# Patient Record
Sex: MALE | Race: WHITE | NOT HISPANIC OR LATINO | Employment: FULL TIME | URBAN - METROPOLITAN AREA
[De-identification: names, ages, dates, MRNs, and addresses within clinical notes are randomized per-mention and may not be internally consistent; named-entity substitution may affect disease eponyms.]

---

## 2017-03-29 ENCOUNTER — OFFICE VISIT (OUTPATIENT)
Dept: FAMILY MEDICINE | Facility: OTHER | Age: 27
End: 2017-03-29
Payer: COMMERCIAL

## 2017-03-29 VITALS
HEIGHT: 67 IN | RESPIRATION RATE: 18 BRPM | HEART RATE: 84 BPM | SYSTOLIC BLOOD PRESSURE: 136 MMHG | BODY MASS INDEX: 36.1 KG/M2 | WEIGHT: 230 LBS | DIASTOLIC BLOOD PRESSURE: 84 MMHG | TEMPERATURE: 98.3 F

## 2017-03-29 DIAGNOSIS — K04.7 DENTAL ABSCESS: Primary | ICD-10-CM

## 2017-03-29 PROCEDURE — 99213 OFFICE O/P EST LOW 20 MIN: CPT | Performed by: NURSE PRACTITIONER

## 2017-03-29 ASSESSMENT — PAIN SCALES - GENERAL: PAINLEVEL: EXTREME PAIN (9)

## 2017-03-29 NOTE — PROGRESS NOTES
"  SUBJECTIVE:                                                    Jarocho Chavez is a 27 year old male who presents to clinic today for the following health issues:    HPI    Concern - Left side of face swollen      Onset: 2 days    Description:   Left side of face swollen from tooth infection. Tooth pain started 2-3 days ago    Intensity: severe, 9/10    Progression of Symptoms:  worsening and constant    Accompanying Signs & Symptoms:  Swelling has about doubled in size since 4:30am       Previous history of similar problem:   Other teeth problems    Precipitating factors:   Worsened by: time    Alleviating factors:  Improved by: nothing       Therapies Tried and outcome: Heat, cold and ibuprofen - no relief. Patient has dental appointment at 1:00 today.  Has history of poor dental caries states has had problems ever since he had a root canal about 3 years ago.     Problem list and histories reviewed & adjusted, as indicated.  Additional history: as documented      ROS:  Constitutional, HEENT, cardiovascular, pulmonary, gi and gu systems are negative, except as otherwise noted.    OBJECTIVE:                                                    /84 (BP Location: Left arm, Patient Position: Chair, Cuff Size: Adult Regular)  Pulse 84  Temp 98.3  F (36.8  C) (Temporal)  Resp 18  Ht 5' 7\" (1.702 m)  Wt 230 lb (104.3 kg)  BMI 36.02 kg/m2  Body mass index is 36.02 kg/(m^2).  GENERAL: pale and fatigued  HENT: normal cephalic/atraumatic, ear canals and TM's normal, nose and mouth Periodontal Exam shows  gingivitis, inflamed, warmth and erythema    NECK: bilateral anterior cervical adenopathy, no asymmetry, masses, or scars and thyroid normal to palpation  RESP: lungs clear to auscultation - no rales, rhonchi or wheezes  CV: regular rate and rhythm, normal S1 S2, no S3 or S4, no murmur, click or rub, no peripheral edema and peripheral pulses strong  ABDOMEN: soft, nontender, no hepatosplenomegaly, no masses " and bowel sounds normal  MS: no gross musculoskeletal defects noted, no edema    Diagnostic Test Results:  none      ASSESSMENT/PLAN:                                                      1. Dental abscess  Will follow up with dentist today and have dentist refer oral surgeon.  - amoxicillin-clavulanate (AUGMENTIN) 875-125 MG per tablet; Take 1 tablet by mouth 2 times daily  Dispense: 20 tablet; Refill: 0    See Patient Instructions    [facility]

## 2017-03-29 NOTE — PATIENT INSTRUCTIONS
Please follow up with dentist today for referral to dental surgeon. Continue to rinse mouth frequently.     Return to clinic if symptoms worsen or do not improve with treatment      Thank you  Tenisha Mccord CNP

## 2017-03-29 NOTE — MR AVS SNAPSHOT
"              After Visit Summary   3/29/2017    Jarocho Chavez    MRN: [id number]           Patient Information     Date Of Birth          1990        Visit Information        Provider Department      3/29/2017 8:40 AM Tenisha Mccord APRN CNP [facility]        Today's Diagnoses     Dental abscess    -  1      Care Instructions    Please follow up with dentist today for referral to dental surgeon. Continue to rinse mouth frequently.     Return to clinic if symptoms worsen or do not improve with treatment      Thank you  Tenisha Mccord CNP          Follow-ups after your visit        Who to contact     If you have questions or need follow up information about today's clinic visit or your schedule please contact [facility] directly at [phone].  Normal or non-critical lab and imaging results will be communicated to you by MyChart, letter or phone within 4 business days after the clinic has received the results. If you do not hear from us within 7 days, please contact the clinic through MyChart or phone. If you have a critical or abnormal lab result, we will notify you by phone as soon as possible.  Submit refill requests through [employer] or call your pharmacy and they will forward the refill request to us. Please allow 3 business days for your refill to be completed.          Additional Information About Your Visit        [employer] Information     [employer] lets you send messages to your doctor, view your test results, renew your prescriptions, schedule appointments and more. To sign up, go to [email or web address] . Click on \"Log in\" on the left side of the screen, which will take you to the Welcome page. Then click on \"Sign up Now\" on the right side of the page.     You will be asked to enter the access code listed below, as well as some personal information. Please follow the directions to create your username and password.     Your access code is: " "[email or web address]  Expires: 2017  9:27 AM     Your access code will  in 90 days. If you need help or a new code, please call your [facility] clinic or [phone].        Care EveryWhere ID     This is your Care EveryWhere ID. This could be used by other organizations to access your [facility] medical records  [id number]        Your Vitals Were     Pulse Respirations Height BMI (Body Mass Index)          84 18 5' 7\" (1.702 m) 36.02 kg/m2         Blood Pressure from Last 3 Encounters:   17 136/84   11 126/66   02/12/10 136/73    Weight from Last 3 Encounters:   17 230 lb (104.3 kg)              Today, you had the following     No orders found for display         Today's Medication Changes          These changes are accurate as of: 3/29/17  9:27 AM.  If you have any questions, ask your nurse or doctor.               Start taking these medicines.        Dose/Directions    amoxicillin-clavulanate 875-125 MG per tablet   Commonly known as:  AUGMENTIN   Used for:  Dental abscess   Started by:  Tenisha Mccord APRN CNP        Dose:  1 tablet   Take 1 tablet by mouth 2 times daily   Quantity:  20 tablet   Refills:  0            Where to get your medicines      These medications were sent to [facility] [address] [place] [address] - [address]  [address] [facility] [address]     Phone:  [phone]     amoxicillin-clavulanate 875-125 MG per tablet                Primary Care Provider    None       No address on file        Thank you!     Thank you for choosing [facility]  for your care. Our goal is always to provide you with excellent care. Hearing back from our patients is one way we can continue to improve our services. Please take a few minutes to complete the written survey that you may receive in the mail after your visit with us. Thank you!             Your Updated Medication List - Protect others around you: Learn how to safely use, store and throw away your medicines at " www.disposemymeds.org.          This list is accurate as of: 3/29/17  9:27 AM.  Always use your most recent med list.                   Brand Name Dispense Instructions for use    ALBUTEROL IN      None Entered       amoxicillin-clavulanate 875-125 MG per tablet    AUGMENTIN    20 tablet    Take 1 tablet by mouth 2 times daily

## 2024-05-28 ENCOUNTER — HOSPITAL ENCOUNTER (EMERGENCY)
Facility: CLINIC | Age: 34
Discharge: HOME | End: 2024-05-28
Attending: STUDENT IN AN ORGANIZED HEALTH CARE EDUCATION/TRAINING PROGRAM | Admitting: STUDENT IN AN ORGANIZED HEALTH CARE EDUCATION/TRAINING PROGRAM

## 2024-05-28 VITALS
BODY MASS INDEX: 30.48 KG/M2 | DIASTOLIC BLOOD PRESSURE: 98 MMHG | OXYGEN SATURATION: 98 % | WEIGHT: 230 LBS | HEART RATE: 87 BPM | TEMPERATURE: 98.1 F | HEIGHT: 73 IN | SYSTOLIC BLOOD PRESSURE: 138 MMHG | RESPIRATION RATE: 18 BRPM

## 2024-05-28 DIAGNOSIS — M25.512 ACUTE PAIN OF LEFT SHOULDER: ICD-10-CM

## 2024-05-28 PROCEDURE — 99283 EMERGENCY DEPT VISIT LOW MDM: CPT | Performed by: STUDENT IN AN ORGANIZED HEALTH CARE EDUCATION/TRAINING PROGRAM

## 2024-05-28 PROCEDURE — 99284 EMERGENCY DEPT VISIT MOD MDM: CPT | Performed by: STUDENT IN AN ORGANIZED HEALTH CARE EDUCATION/TRAINING PROGRAM

## 2024-05-28 RX ORDER — METHYLPREDNISOLONE 4 MG
TABLET, DOSE PACK ORAL
Qty: 21 TABLET | Refills: 0 | Status: SHIPPED | OUTPATIENT
Start: 2024-05-28

## 2024-05-28 RX ORDER — METHOCARBAMOL 750 MG/1
750 TABLET, FILM COATED ORAL 4 TIMES DAILY PRN
Qty: 15 TABLET | Refills: 0 | Status: SHIPPED | OUTPATIENT
Start: 2024-05-28 | End: 2024-06-02

## 2024-05-28 ASSESSMENT — COLUMBIA-SUICIDE SEVERITY RATING SCALE - C-SSRS
6. HAVE YOU EVER DONE ANYTHING, STARTED TO DO ANYTHING, OR PREPARED TO DO ANYTHING TO END YOUR LIFE?: NO
2. HAVE YOU ACTUALLY HAD ANY THOUGHTS OF KILLING YOURSELF IN THE PAST MONTH?: NO
1. IN THE PAST MONTH, HAVE YOU WISHED YOU WERE DEAD OR WISHED YOU COULD GO TO SLEEP AND NOT WAKE UP?: NO

## 2024-05-28 ASSESSMENT — ACTIVITIES OF DAILY LIVING (ADL): ADLS_ACUITY_SCORE: 37

## 2024-05-28 NOTE — DISCHARGE INSTRUCTIONS
Your exam today is very reassuring.  I have very low suspicion for fracture or dislocation, since your range of motion is normal and you do not have any specific areas of tenderness to the bone.    I think the best thing for your shoulder would be to rest it for a few days.  Alternate between ice and heat.  You should also alternate between Tylenol and ibuprofen every few hours.      I think you will also benefit from a short course of steroids to help with inflammation.  Take this as instructed until it is gone.  Prescription for muscle relaxer was provided as well.  Use this only as needed.    Referral to orthopedics was provided today; make an appointment soon as possible.  You may benefit from additional workup/treatment from them.  Return to the emergency department sooner for any new or worsening symptoms.

## 2024-05-28 NOTE — ED PROVIDER NOTES
History     Chief Complaint   Patient presents with    Shoulder Pain     HPI  Jarocho Chavez is a 34 year old male with no relevant medical history who presents for evaluation of left shoulder pain.  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  He started noticing some pain in decreased range of motion to the left shoulder about 5 days ago.  At first he thought he just slept on it wrong, but the discomfort persisted much longer than usual.  He mostly notices some stiffness in the morning but is able to overcome this throughout the day.  If anything, the pain and stiffness is gradually improving, but he is hoping to return to work for the week and came in for evaluation.  Ibuprofen is only providing temporary relief.  He denies any known arthritis or previous surgeries to the shoulder.  Patient further denies any neck pain, focal tingling or weakness, redness or warmth, other complaints today.    Allergies:  Allergies   Allergen Reactions    Suprax [Cefixime]      Problem List:    There are no problems to display for this patient.     Past Medical History:    No past medical history on file.    Past Surgical History:    No past surgical history on file.    Family History:    No family history on file.    Social History:  Marital Status:  Single [1]  Social History     Tobacco Use    Smoking status: Every Day     Current packs/day: 1.00     Average packs/day: 1 pack/day for 5.0 years (5.0 ttl pk-yrs)     Types: Cigarettes   Substance Use Topics    Alcohol use: Yes     Comment: occ    Drug use: No        Medications:    methocarbamol (ROBAXIN) 750 MG tablet  methylPREDNISolone (MEDROL DOSEPAK) 4 MG tablet therapy pack  ALBUTEROL IN  amoxicillin-clavulanate (AUGMENTIN) 875-125 MG per tablet      Review of Systems   All other systems reviewed and are negative.  See HPI.    Physical Exam   BP: (!)  "138/98  Pulse: 87  Temp: 98.1  F (36.7  C)  Resp: 18  Height: 185.4 cm (6' 1\")  Weight: 104.3 kg (230 lb)  SpO2: 98 %    Physical Exam  Vitals and nursing note reviewed.   Constitutional:       Appearance: Normal appearance. He is not toxic-appearing.      Comments: Slightly anxious.  Otherwise no distress.   HENT:      Head: Normocephalic and atraumatic.   Eyes:      General: No scleral icterus.     Extraocular Movements: Extraocular movements intact.      Conjunctiva/sclera: Conjunctivae normal.      Pupils: Pupils are equal, round, and reactive to light.   Neck:      Comments: No midline tenderness.  No paraspinal muscular tenderness.  Range of motion is entirely normal.  Cardiovascular:      Rate and Rhythm: Normal rate and regular rhythm.      Pulses: Normal pulses.      Heart sounds: Normal heart sounds.   Pulmonary:      Effort: Pulmonary effort is normal. No respiratory distress.      Breath sounds: Normal breath sounds.   Abdominal:      Palpations: Abdomen is soft.      Tenderness: There is no abdominal tenderness.   Musculoskeletal:         General: No tenderness or deformity. Normal range of motion.      Cervical back: Neck supple. No rigidity or tenderness.      Comments: Patient describes having a stiffness sensation to the left shoulder, but has completely range of motion including full active abduction and flexion.  Patient has normal strength with internal/external rotation.   strength is normal.  He has no focal areas of tenderness to the clavicle, AC joint, or any other bony structures along the scapula or proximal humerus.   Skin:     General: Skin is warm.      Capillary Refill: Capillary refill takes less than 2 seconds.      Findings: No erythema or rash.   Neurological:      Mental Status: He is alert.         ED Course        Procedures            No results found for this or any previous visit (from the past 24 hour(s)).    Medications - No data to display    Assessments & Plan (with " Medical Decision Making)     I have reviewed the nursing notes.    I have reviewed the findings, diagnosis, plan and need for follow up with the patient.    Medical Decision Making  Jarocho Chavez is a 34 year old male with no relevant medical history who presents for evaluation of left shoulder pain.  Normal vitals on arrival.  Exam is very reassuring overall.  Patient states that his symptoms have been gradually improving over the past few days and he actually has normal active range of motion, including full abduction and flexion.  He has no reproducible areas of bony tenderness and I do not appreciate any clicking or clunking this during range of motion exercise to raise suspicion for dislocation.  I think his symptoms are probably due to a rotator cuff injury, arthritis, or adhesive capsulitis.  X-ray would probably show some degree of arthritis, but I doubt this would significantly  today.  Patient was in agreement with this plan.  I think he would most benefit from resting the shoulder for a few days and using anti-inflammatory therapies including Tylenol, ice.  Will also provide a Medrol dose pack to help with inflammation.  Referral to orthopedics provided for further evaluation and treatment.  Patient agrees to follow-up as instructed and will return sooner for any new or acutely worsening symptoms.    Discharge Medication List as of 5/28/2024  6:50 AM        START taking these medications    Details   methocarbamol (ROBAXIN) 750 MG tablet Take 1 tablet (750 mg) by mouth 4 times daily as needed for muscle spasms, Disp-15 tablet, R-0, E-Prescribe      methylPREDNISolone (MEDROL DOSEPAK) 4 MG tablet therapy pack Follow Package Directions, Disp-21 tablet, R-0, E-Prescribe           Final diagnoses:   Acute pain of left shoulder     5/28/2024   [facility] EMERGENCY DEPT       Hermilo Fraser MD  05/28/24 [id number]

## 2024-05-28 NOTE — Clinical Note
Jarocho Chavez was seen and treated in our emergency department on 5/28/2024.  He may return to work on 05/31/2024.       If you have any questions or concerns, please don't hesitate to call.      Hermilo Fraser MD